# Patient Record
Sex: FEMALE | Race: WHITE
[De-identification: names, ages, dates, MRNs, and addresses within clinical notes are randomized per-mention and may not be internally consistent; named-entity substitution may affect disease eponyms.]

---

## 2020-03-15 ENCOUNTER — HOSPITAL ENCOUNTER (EMERGENCY)
Dept: HOSPITAL 62 - ER | Age: 20
Discharge: HOME | End: 2020-03-15
Payer: OTHER GOVERNMENT

## 2020-03-15 VITALS — SYSTOLIC BLOOD PRESSURE: 117 MMHG | DIASTOLIC BLOOD PRESSURE: 64 MMHG

## 2020-03-15 DIAGNOSIS — O20.0: Primary | ICD-10-CM

## 2020-03-15 DIAGNOSIS — R10.2: ICD-10-CM

## 2020-03-15 DIAGNOSIS — Z3A.01: ICD-10-CM

## 2020-03-15 LAB
ADD MANUAL DIFF: NO
ALBUMIN SERPL-MCNC: 4.8 G/DL (ref 3.7–5.6)
ALP SERPL-CCNC: 46 U/L (ref 50–135)
ANION GAP SERPL CALC-SCNC: 13 MMOL/L (ref 5–19)
APPEARANCE UR: (no result)
APTT PPP: YELLOW S
AST SERPL-CCNC: 19 U/L (ref 5–30)
BASOPHILS # BLD AUTO: 0 10^3/UL (ref 0–0.2)
BASOPHILS NFR BLD AUTO: 0.5 % (ref 0–2)
BILIRUB DIRECT SERPL-MCNC: 0.2 MG/DL (ref 0–0.4)
BILIRUB SERPL-MCNC: 0.6 MG/DL (ref 0.2–1.3)
BILIRUB UR QL STRIP: NEGATIVE
BUN SERPL-MCNC: 8 MG/DL (ref 7–20)
CALCIUM: 9.5 MG/DL (ref 8.4–10.2)
CHLAM PCR: NOT DETECTED
CHLORIDE SERPL-SCNC: 101 MMOL/L (ref 98–107)
CO2 SERPL-SCNC: 24 MMOL/L (ref 22–30)
EOSINOPHIL # BLD AUTO: 0.1 10^3/UL (ref 0–0.6)
EOSINOPHIL NFR BLD AUTO: 0.9 % (ref 0–6)
EPITHELIALS (WET MOUNT): (no result)
ERYTHROCYTE [DISTWIDTH] IN BLOOD BY AUTOMATED COUNT: 16.4 % (ref 11.5–14)
GLUCOSE SERPL-MCNC: 84 MG/DL (ref 75–110)
GLUCOSE UR STRIP-MCNC: NEGATIVE MG/DL
HCT VFR BLD CALC: 36 % (ref 36–47)
HGB BLD-MCNC: 12.3 G/DL (ref 12–15.5)
KETONES UR STRIP-MCNC: (no result) MG/DL
LYMPHOCYTES # BLD AUTO: 2.1 10^3/UL (ref 0.5–4.7)
LYMPHOCYTES NFR BLD AUTO: 35.4 % (ref 13–45)
MCH RBC QN AUTO: 26.7 PG (ref 27–33.4)
MCHC RBC AUTO-ENTMCNC: 34.1 G/DL (ref 32–36)
MCV RBC AUTO: 78 FL (ref 80–97)
MONOCYTES # BLD AUTO: 0.6 10^3/UL (ref 0.1–1.4)
MONOCYTES NFR BLD AUTO: 10 % (ref 3–13)
NEUTROPHILS # BLD AUTO: 3.2 10^3/UL (ref 1.7–8.2)
NEUTS SEG NFR BLD AUTO: 53.2 % (ref 42–78)
NITRITE UR QL STRIP: NEGATIVE
PH UR STRIP: 9 [PH] (ref 5–9)
PLATELET # BLD: 288 10^3/UL (ref 150–450)
POTASSIUM SERPL-SCNC: 3.6 MMOL/L (ref 3.6–5)
PROT SERPL-MCNC: 8.4 G/DL (ref 6.3–8.2)
PROT UR STRIP-MCNC: 30 MG/DL
RBC # BLD AUTO: 4.59 10^6/UL (ref 3.72–5.28)
RBCS (WET MOUNT): (no result)
SP GR UR STRIP: 1.02
T.VAGINALIS (WET MOUNT): (no result)
TOTAL CELLS COUNTED % (AUTO): 100 %
UROBILINOGEN UR-MCNC: NEGATIVE MG/DL (ref ?–2)
WBC # BLD AUTO: 6 10^3/UL (ref 4–10.5)
WBCS (WET MOUNT): (no result)
YEAST (WET MOUNT): (no result)

## 2020-03-15 PROCEDURE — 36415 COLL VENOUS BLD VENIPUNCTURE: CPT

## 2020-03-15 PROCEDURE — 86901 BLOOD TYPING SEROLOGIC RH(D): CPT

## 2020-03-15 PROCEDURE — 81001 URINALYSIS AUTO W/SCOPE: CPT

## 2020-03-15 PROCEDURE — 99284 EMERGENCY DEPT VISIT MOD MDM: CPT

## 2020-03-15 PROCEDURE — 85025 COMPLETE CBC W/AUTO DIFF WBC: CPT

## 2020-03-15 PROCEDURE — 87591 N.GONORRHOEAE DNA AMP PROB: CPT

## 2020-03-15 PROCEDURE — 86900 BLOOD TYPING SEROLOGIC ABO: CPT

## 2020-03-15 PROCEDURE — 87210 SMEAR WET MOUNT SALINE/INK: CPT

## 2020-03-15 PROCEDURE — 87491 CHLMYD TRACH DNA AMP PROBE: CPT

## 2020-03-15 PROCEDURE — 93976 VASCULAR STUDY: CPT

## 2020-03-15 PROCEDURE — 80053 COMPREHEN METABOLIC PANEL: CPT

## 2020-03-15 PROCEDURE — 76817 TRANSVAGINAL US OBSTETRIC: CPT

## 2020-03-15 PROCEDURE — 84702 CHORIONIC GONADOTROPIN TEST: CPT

## 2020-03-15 NOTE — ER DOCUMENT REPORT
ED GI/





- General


Chief Complaint: Vag Bleeding, +preg <12wks


Stated Complaint: VAGINAL BLEEDING


Time Seen by Provider: 03/15/20 12:33


Primary Care Provider: 


WILLIAN ROSARIO MD [ACTIVE PROVISIONAL STAFF] - Follow up as needed


Mode of Arrival: Ambulatory


Notes: 





CHIEF COMPLAINT: Vaginal spotting and cramping in the pelvis with pregnancy





HPI: 19-year-old female presenting to the emergency department for evaluation of

vaginal spotting with pelvic cramping in the setting of pregnancy.  She believes

she is approximately 7 weeks pregnant by dates.  Denies fever nausea vomiting 

has not seen OB for this pregnancy yet.





ROS: See HPI - all other systems were reviewed and are otherwise negative


Constitutional: no fever or recent illness


Eyes: no drainage, no blurred vision


ENT: no runny nose, no sore throat


Cardiovascular:  no chest pain 


Resp: no SOB, no cough


GI: no vomiting, no diarrhea


: no dysuria, no vaginal discharge, positive vaginal bleeding, positive 

cramping


Integumentary: no rash 


Allergy: no hives 


Musculoskeletal: no extremity pain or swelling 


Neurological: no numbness/tingling, no weakness





MEDICATIONS: I agree with the patient medications as charted by the RN.





ALLERGIES: I agree with the allergies as charted by the RN.





PAST MEDICAL HISTORY/PAST SURGICAL HISTORY: Reviewed and agree as charted by RN.





SOCIAL HISTORY: Reviewed and agree as charted by RN.





FAMILY HISTORY: No significant familial comorbid conditions directly related to 

patient complaint





EXAM:


Reviewed vital signs as charted by RN.


CONSTITUTIONAL: Alert and oriented and responds appropriately to questions. 

Well-appearing; well-nourished, no acute distress


HEAD: Normocephalic; atraumatic


EYES: PERRL; Conjunctivae clear, sclerae non-icteric


ENT: normal nose; no rhinorrhea; moist mucous membranes; pharynx without lesions

noted


NECK: Supple without meningismus; non-tender; no cervical lymphadenopathy, no 

masses


CARD: RRR; no murmurs, no clicks, no rubs, no gallops; symmetric distal pulses


RESP: Normal chest excursion without splinting or tachypnea; breath sounds clear

and equal bilaterally; no wheezes, no rhonchi, no rales, 


ABD/GI: Normal bowel sounds; non-distended; soft, non-tender, no rebound, no 

guarding; no palpable organomegaly or masses


: Female nurse chaperone present. External genitalia normal. No skin lesions 

noted. Pelvic Exam: No active bleeding. No purulent discharge. Cervix appears 

normal. No CMT. No lesions or masses. Uterus enlarged consistent with 7 weeks 

gestation and non tender. Right/Left adnexa normal size and non tender.


BACK:  The back appears normal and is non-tender to palpation, there is no CVA 

tenderness


EXT: Normal ROM in all joints; non-tender to palpation; no cyanosis, no 

effusions, no edema   


SKIN: Normal color for age and race; warm; dry; good turgor; no acute lesions 

noted


NEURO: Moves all extremities equally; Motor and sensory function intact 


PSYCH: The patient's mood and manner are appropriate. Grooming and personal 

hygiene are appropriate. 





MDM: 19-year-old female presenting with vaginal spotting, no active bleeding 

seen on exam, cervix is closed.  Patient ultrasound ordered through triage 

process shows a 7-week intrauterine gestation.  Lab work does not show acute 

emergent abnormalities.  Discharged to follow-up with OB/GYN


TRAVEL OUTSIDE OF THE [place] IN LAST 30 DAYS: No





- Related Data


Allergies/Adverse Reactions: 


                                        





No Known Allergies Allergy (Unverified 03/15/20 12:33)


   











Past Medical History





- General


Information source: Patient


Last Menstrual Period: 20





- Social History


Smoking Status: Never Smoker


Cigarette use (# per day): Yes


Frequency of alcohol use: None


Drug Abuse: None


Lives with: Family


Family History: Reviewed & Not Pertinent


Patient has suicidal ideation: No


Patient has homicidal ideation: No


Past Surgical History: Reports: Hx  Section - x1





Physical Exam





- Vital signs


Vitals: 


                                        











Temp Pulse Resp BP Pulse Ox


 


 98.1 F   87   16   117/64   98 


 


 03/15/20 12:30  03/15/20 12:30  03/15/20 12:30  03/15/20 12:30  03/15/20 12:30














Course





- Re-evaluation


Re-evalutation: 





03/15/20 14:43


Wet prep does not suggest vaginitis.  Will discharge home with return 

precautions





- Vital Signs


Vital signs: 


                                        











Temp Pulse Resp BP Pulse Ox


 


 98.1 F   87   16   117/64   98 


 


 03/15/20 12:30  03/15/20 12:30  03/15/20 12:30  03/15/20 12:30  03/15/20 12:30














- Laboratory


Result Diagrams: 


                                 03/15/20 12:40





                                 03/15/20 12:40


Laboratory results interpreted by me: 


                                        











  03/15/20 03/15/20 03/15/20





  12:40 12:40 12:40


 


MCV  78 L  


 


MCH  26.7 L  


 


RDW  16.4 H  


 


Creatinine   0.51 L 


 


Alkaline Phosphatase   46 L 


 


Total Protein   8.4 H 


 


Beta HCG, Quant   98739.00 H 


 


Urine Protein    30 H


 


Urine Ketones    TRACE H














Discharge





- Discharge


Clinical Impression: 


 Threatened miscarriage in early pregnancy





Condition: Stable


Disposition: HOME, SELF-CARE


Additional Instructions: 


1. follow up with OB for repeat Beta-HCG blood test in 3-5 days


2. follow up with OB for further evaluation and treatment, call for appt.


3. return to the ED for any worsening/onset of abdominal pain, vomiting or 

vaginal bleeding where you are saturating > 1 pad per hour


Referrals: 


WILLIAN ROSARIO MD [ACTIVE PROVISIONAL STAFF] - Follow up as needed

## 2020-03-15 NOTE — ER DOCUMENT REPORT
ED Medical Screen (RME)





- General


Mode of Arrival: Ambulatory


Information source: Patient


Cannot obtain history due to: Other - This 19-year-old female presents to the 

emergency room today stating that she was 7 weeks pregnant had vaginal bleeding 

spotting cramping since last night.  Also feels slightly lightheaded.


TRAVEL OUTSIDE OF THE [place] IN LAST 30 DAYS: No





<JAMAL HARVEY - Last Filed: 03/15/20 12:34>





<JUSTIN MARTINEZ - Last Filed: 03/15/20 14:21>





- General


Chief Complaint: Vaginal Bleeding


Stated Complaint: VAGINAL BLEEDING


Time Seen by Provider: 03/15/20 12:33


Notes: 





CHIEF COMPLAINT: Vaginal spotting and cramping in the pelvis with pregnancy





 (JUSTIN MARTINEZ)





- Related Data


Allergies/Adverse Reactions: 


                                        





No Known Allergies Allergy (Unverified 03/15/20 12:33)


   











Past Medical History





- General


Information source: Patient





- Social History


Cigarette use (# per day): Yes


Frequency of alcohol use: None


Drug Abuse: None


Lives with: Family





<JAMAL HARVEY - Last Filed: 03/15/20 12:34>





Review of Systems





- Review of Systems


Constitutional: No symptoms reported


EENT: No symptoms reported


Cardiovascular: No symptoms reported


Respiratory: No symptoms reported


Gastrointestinal: No symptoms reported


Genitourinary: No symptoms reported


Female Genitourinary: No symptoms reported


Musculoskeletal: No symptoms reported


Skin: No symptoms reported


Hematologic/Lymphatic: No symptoms reported


Neurological/Psychological: No symptoms reported





<JAMAL HARVEY - Last Filed: 03/15/20 12:34>





Physical Exam





- Vital signs


Interpretation: Normal





- General


General appearance: Appears well, Alert





- HEENT


Head: Normocephalic, Atraumatic


Eyes: Normal


Pupils: PERRL





- Respiratory


Respiratory status: No respiratory distress


Chest status: Nontender


Breath sounds: Normal


Chest palpation: Normal





- Cardiovascular


Rhythm: Regular


Heart sounds: Normal auscultation


Murmur: No





- Abdominal


Inspection: Normal


Distension: No distension


Bowel sounds: Normal


Tenderness: Nontender


Organomegaly: No organomegaly





- Back


Back: Normal, Nontender





- Extremities


General upper extremity: Normal inspection, Nontender, Normal color, Normal ROM,

Normal temperature


General lower extremity: Normal inspection, Nontender, Normal color, Normal ROM,

Normal temperature, Normal weight bearing.  No: Harris's sign





- Neurological


Neuro grossly intact: Yes


Cognition: Normal


Orientation: AAOx4


Louis Coma Scale Eye Opening: Spontaneous


Louis Coma Scale Verbal: Oriented


Lynn Coma Scale Motor: Obeys Commands


Lynn Coma Scale Total: 15


Speech: Normal


Motor strength normal: LUE, RUE, LLE, RLE


Sensory: Normal





- Psychological


Associated symptoms: Normal affect, Normal mood





- Skin


Skin Temperature: Warm


Skin Moisture: Dry


Skin Color: Normal





<JAMAL HARVEY F - Last Filed: 03/15/20 12:34>





- Vital signs


Vitals: 


                                        











Temp Pulse Resp BP Pulse Ox


 


 98.1 F   87   16   117/64   98 


 


 03/15/20 12:30  03/15/20 12:30  03/15/20 12:30  03/15/20 12:30  03/15/20 12:30














Course





- Laboratory


Result Diagrams: 


                                 03/15/20 12:40





                                 03/15/20 12:40





<JUSTIN MARTINEZ J - Last Filed: 03/15/20 14:21>





- Vital Signs


Vital signs: 


                                        











Temp Pulse Resp BP Pulse Ox


 


 98.1 F   87   16   117/64   98 


 


 03/15/20 12:30  03/15/20 12:30  03/15/20 12:30  03/15/20 12:30  03/15/20 12:30














- Laboratory


Laboratory results interpreted by me: 


                                        











  03/15/20 03/15/20 03/15/20





  12:40 12:40 12:40


 


MCV  78 L  


 


MCH  26.7 L  


 


RDW  16.4 H  


 


Creatinine   0.51 L 


 


Alkaline Phosphatase   46 L 


 


Total Protein   8.4 H 


 


Beta HCG, Quant   12647.00 H 


 


Urine Protein    30 H


 


Urine Ketones    TRACE H

## 2020-03-15 NOTE — RADIOLOGY REPORT (SQ)
EXAM DESCRIPTION:  U/S OB TRANSVAG W/DOPPLER



COMPLETED DATE/TIME:  3/15/2020 1:16 pm



REASON FOR STUDY:  pregnant spotting



COMPARISON:  None.



TECHNIQUE:  Transvaginal static and realtime grayscale images acquired of the pelvis. Additional pooja
cted spectral and color Doppler images recorded. All images stored on PACs.

bHCG: Pending.

CLINICAL DATES:  7 weeks, 5 days



LIMITATIONS:  None.



FINDINGS:  FETUS:  Single Living intrauterine pregnancy.

ULTRASOUND EGA: 7 weeks, 0 days

ULTRASOUND MARILOU: 11/1/2020

EFW: Not applicable less than 20 weeks.

CRL:  9 mm

FHR: 128  beats per minute.

FETAL SURVEY: Too early to assess.

AMNIOTIC FLUID: Adequate amount.

PLACENTA: Not yet developed due to early gestation.

SUBCHORIONIC BLEED: No.

SIZE OF BLEED: Not applicable.

UTERUS: No masses. No anomalies.

CERVICAL LENGTH: 3.2 cm   Closed.

RIGHT ADNEXA: Normal ovary with normal vascular flow.

No adnexal free fluid.

No adnexal masses.

LEFT ADNEXA: Normal ovary with normal vascular flow.

No adnexal free fluid.

No adnexal masses.

FREE FLUID: None.

OTHER: No other significant finding.



IMPRESSION:  LIVING INTRAUTERINE PREGNANCY.

EGA 7 weeks, 0 days

Trimester of pregnancy: First trimester - 0 to 13 weeks.



TECHNICAL DOCUMENTATION:  JOB ID:  [id number]

 2011 [employer]- All Rights Reserved                            rev-5/18



Reading location - IP/workstation name: [email or web address]